# Patient Record
Sex: FEMALE
[De-identification: names, ages, dates, MRNs, and addresses within clinical notes are randomized per-mention and may not be internally consistent; named-entity substitution may affect disease eponyms.]

---

## 2019-11-12 ENCOUNTER — HOSPITAL ENCOUNTER (OUTPATIENT)
Dept: HOSPITAL 72 - SUR | Age: 34
Discharge: HOME | End: 2019-11-12
Payer: COMMERCIAL

## 2019-11-12 VITALS — DIASTOLIC BLOOD PRESSURE: 79 MMHG | SYSTOLIC BLOOD PRESSURE: 124 MMHG

## 2019-11-12 VITALS — SYSTOLIC BLOOD PRESSURE: 122 MMHG | DIASTOLIC BLOOD PRESSURE: 62 MMHG

## 2019-11-12 VITALS — DIASTOLIC BLOOD PRESSURE: 82 MMHG | SYSTOLIC BLOOD PRESSURE: 136 MMHG

## 2019-11-12 VITALS — SYSTOLIC BLOOD PRESSURE: 140 MMHG | DIASTOLIC BLOOD PRESSURE: 66 MMHG

## 2019-11-12 VITALS — SYSTOLIC BLOOD PRESSURE: 147 MMHG | DIASTOLIC BLOOD PRESSURE: 86 MMHG

## 2019-11-12 VITALS — SYSTOLIC BLOOD PRESSURE: 145 MMHG | DIASTOLIC BLOOD PRESSURE: 84 MMHG

## 2019-11-12 VITALS — WEIGHT: 293 LBS | BODY MASS INDEX: 44.41 KG/M2 | HEIGHT: 68 IN

## 2019-11-12 VITALS — DIASTOLIC BLOOD PRESSURE: 95 MMHG | SYSTOLIC BLOOD PRESSURE: 147 MMHG

## 2019-11-12 VITALS — SYSTOLIC BLOOD PRESSURE: 140 MMHG | DIASTOLIC BLOOD PRESSURE: 80 MMHG

## 2019-11-12 VITALS — DIASTOLIC BLOOD PRESSURE: 75 MMHG | SYSTOLIC BLOOD PRESSURE: 144 MMHG

## 2019-11-12 VITALS — SYSTOLIC BLOOD PRESSURE: 149 MMHG | DIASTOLIC BLOOD PRESSURE: 84 MMHG

## 2019-11-12 DIAGNOSIS — R73.03: ICD-10-CM

## 2019-11-12 DIAGNOSIS — G56.02: ICD-10-CM

## 2019-11-12 DIAGNOSIS — I10: ICD-10-CM

## 2019-11-12 DIAGNOSIS — E66.1: ICD-10-CM

## 2019-11-12 DIAGNOSIS — Z97.5: ICD-10-CM

## 2019-11-12 DIAGNOSIS — G56.22: Primary | ICD-10-CM

## 2019-11-12 PROCEDURE — 94003 VENT MGMT INPAT SUBQ DAY: CPT

## 2019-11-12 PROCEDURE — 94150 VITAL CAPACITY TEST: CPT

## 2019-11-12 PROCEDURE — 64721 CARPAL TUNNEL SURGERY: CPT

## 2019-11-12 PROCEDURE — 64718 REVISE ULNAR NERVE AT ELBOW: CPT

## 2019-11-12 PROCEDURE — 81025 URINE PREGNANCY TEST: CPT

## 2019-11-12 NOTE — 48 HOUR POST ANESTHESIA EVAL
Post Anesthesia Evaluation


Procedure:  L CTR and Cubital Tunnel Release


Date of Evaluation:  Nov 12, 2019


Time of Evaluation:  11:43


Blood Pressure Systolic:  167


0:  98


Pulse Rate:  78


Respiratory Rate:  18


Temperature (Fahrenheit):  98.2


O2 Sat by Pulse Oximetry:  99


Airway:  patent


Nausea:  No


Vomiting:  No


Pain Intensity:  2


Hydration Status:  adequate


Cardiopulmonary Status:


Stable


Mental Status/LOC:  patient returned to baseline


Follow-up Care/Observations:


0


Post-Anesthesia Complications:


0


Follow-up care needed:  ready to discharge











Kedar Meneses MD Nov 12, 2019 07:09

## 2019-11-12 NOTE — BRIEF OPERATIVE NOTE
Immediate Post Operative Note


Operative Note


Pre-op Diagnosis:


post traumatic cubital and carpal tunnel syndromes


Post-op Diagnosis:  same as pre-op


Surgeon:  Ginny


Specimen:  none


Complications:  none


Condition:  stable


Fluids:  300


Estimated Blood Loss:  minimal


Drains:  none


Implant(s) used?:  No - ulnar nerve tight at elbow, median nerve tight at wrist











Ernie Gross MD Nov 12, 2019 09:14

## 2019-11-12 NOTE — IMMEDIATE POST-OP EVALUATION
Immediate Post-Op Evalulation


Immediate Post-Op Evalulation


Procedure:  L CTR and Cubital Tunnel Release


Date of Evaluation:  2019


Time of Evaluation:  09:30


IV Fluids:  800 LR


Blood Products:  0


Estimated Blood Loss:  20


Urinary Output:  0


Blood Pressure Systolic:  149


Blood Pressure Diastolic:  84


Pulse Rate:  78


Respiratory Rate:  16


O2 Sat by Pulse Oximetry:  99


Temperature (Fahrenheit):  97.1


Pain Score (1-10):  2


Nausea:  No


Vomiting:  No


Complications


0


Patient Status:  awake, reacts, patent, extubated, none


Hydration Status:  adequate


Dru Grams Ancef IV


Given Within 1 Hr of Incision:  Yes


Time Given:  07:46











Kedar Meneses MD 2019 07:06

## 2019-11-12 NOTE — ANETHESIA PREOPERATIVE EVAL
Anesthesia Pre-op PMH/ROS


General


Date of Evaluation:  2019


Time of Evaluation:  07:24


Anesthesiologist:  Nicholas


ASA Score:  ASA 3


Mallampati Score


Class I : Soft palate, uvula, fauces, pillars visible


Class II: Soft palate, uvula, fauces visible


Class III: Soft palate, base of uvula visible


Class IV: Only hard plate visible


Mallampati Classification:  Class III


Surgeon:  Kathyrn


Diagnosis:  L Hand Pain


Surgical Procedure:  L CTR and Cubital Tunnel Release


Anesthesia History:  none


Family History:  no anesthesia problems


Allergies:  


Coded Allergies:  


     No Known Allergies (Unverified , 19)


Medications:  see eMAR


Patient NPO?:  Yes





Past Medical History


Cardiovascular:  Reports: HTN


Other:  obesity - MORBID BMI 65





Anesthesia Pre-op Phys. Exam


Physician Exam





Last Vital Signs








  Date Time  Temp Pulse Resp B/P (MAP) Pulse Ox O2 Delivery O2 Flow Rate FiO2


 


19 05:46      Room Air  


 


19 05:43 98.1 79 18 147/95 99   








Constitutional:  NAD


Neurologic:  CN 2-12 intact


Cardiovascular:  RRR


Respiratory:  CTA


Gastrointestinal:  S/NT/ND





Airway Exam


Mallampati Score:  Class III


MO:  limited


ROM:  limited


Teeth:  missing, intact





Anesthesia Pre-op A/P


Labs


Urine Pregnancy Test











Test


  19


05:20


 


Urine HCG, Qualitative


  Negative


(NEGATIVE)











Risk Assessment & Plan


Assessment:


ASA 3


Plan:


GA, SED, GlideScope Go


Status Change Before Surgery:  No





Pre-Antibiotics


Dru Grams Ancef IV


Given Within 1 Hr of Incision:  Yes


Time Given:  07:46











Kedar Meneses MD 2019 07:04

## 2019-11-12 NOTE — PRE-PROCEDURE NOTE/ATTESTATION
Pre-Procedure Note/Attestation


Complete Prior to Procedure


Planned Procedure:  left


Procedure Narrative:


cubital and carpal tunnel releases





Indications for Procedure


Pre-Operative Diagnosis:


post traumatic cubital and carpal tunnel syndromes





Attestation


I attest that I discussed the nature of the procedure; its benefits; risks and 

complications; and alternatives (and the risks and benefits of such alternatives

), prior to the procedure, with the patient (or the patient's legal 

representative).





I attest that, if there was a reasonable possibility of needing a blood 

transfusion, the patient (or the patient's legal representative) was given the 

[facility] standardized written summary, pursuant 

to the [employer] Blood Safety Act ([place] Health and Safety Code # 1645, as 

amended).





I attest that I re-evaluated the patient just prior to the surgery and that 

there has been no change in the patient's H&P, except as documented below:











Ernie Gross MD Nov 12, 2019 07:40

## 2019-11-14 NOTE — OPERATIVE NOTE - DICTATED
DATE OF OPERATION:  11/12/2019

PREOPERATIVE DIAGNOSES:  Left upper extremity cubital tunnel syndrome and

carpal tunnel syndrome, posttraumatic.



POSTOPERATIVE DIAGNOSES:  Left upper extremity cubital tunnel syndrome and

carpal tunnel syndrome, posttraumatic.



FINDINGS:  Compressed ulnar nerve at the elbow and compressed median nerve

at the wrist.



SURGEON:  Ernie Gross M.D.



ASSISTANT:  None.



ESTIMATED BLOOD LOSS:  20 mL.



TOURNIQUET TIME:  0.



ANESTHESIA:  General endotracheal tube.



INDICATIONS FOR SURGERY:  The patient is status post trauma to the left

upper extremity with persistent numbness and tingling caused by the

injury.  Electrodiagnostic studies confirm cubital tunnel syndrome and

carpal tunnel syndrome.



DESCRIPTION OF PROCEDURE IN DETAIL:  The patient was brought into the

operating room and identified as the patient.  A time out was performed.

A general anesthetic was induced.



The patient was placed supine on the operating room table.  The left arm

was prepped and draped up through the shoulder.  A serial tourniquet was

available, but was not applied during the procedure.



Attention was first directed at the wrist.  A 1 inch incision was made

beginning at the distal flexor crease.  Hemostasis was obtained as

bleeding was encountered.  Carefully and methodically, the incision was

deepened until the transverse carpal ligament was identified.  Protecting

the nerve beneath with a hemostat, the transverse carpal ligament was

gently and carefully divided same on the ulnar side of the nerve.



The dissection was carried out proximally and distally.  A freer was

used to be certain the median nerve was no longer compressed.  It had an

_______ configuration right at the distal palmar crease.



Upon first visualizing in the median nerve, the volar vein was not

identified or blood vessel was not visualized, but within a couple of

minutes, it became visible indicating that the compression had been

released.



The wound was thoroughly irrigated with normal saline.  The skin was

closed using 4-0 Vicryl simple interrupted.  A _______ dressing was placed

across the wrist.



Next attention was directed at the elbow.  Although it was somewhat

difficult, the medial epicondyle was palpated and the olecranon was

palpated.  The course of the nerve was trace.



An approximately 5 inch incision was made centered over the elbow.

There was extensive fat in the area.  Hemostasis was obtained as bleeding

was encountered.  Deep retractors were used.



Slowly and methodically, the soft tissue was carried out until the ulnar

nerve could be identified.  There appeared to be compression right at the

medial epicondyle.  Consideration was given to a medial epicondylectomy,

but it was elected not to do so, but simply to release the nerve.  This

was performed.  The release was carried out proximally and distally.   A

freer was used to confirm that the nerve was completely decompressed.



The wound was thoroughly irrigated with normal saline.



The skin was anesthetized using Marcaine.  The subcutaneous tissue was

closed using 2-0 Vicryl.  The skin was closed using 4-0 nylon interrupted

horizontal mattress sutures.



A sterile dressing was applied to the elbow and a sterile dressing was

applied to the wrist.  The arm was placed into a long arm well-padded

splint.  She was placed into a sling.  She was awakened in the operating

room in satisfactory condition.



COMPLICATIONS:  None.









  ______________________________________________

  Ernie Gross M.D.





DR:  HENRY

D:  11/14/2019 08:48

T:  11/14/2019 14:04

JOB#:  [id number]

CC:  Ernie Gross M.D.; [address] [place] [id number].; Fax#:  [phone]